# Patient Record
Sex: FEMALE | Race: WHITE
[De-identification: names, ages, dates, MRNs, and addresses within clinical notes are randomized per-mention and may not be internally consistent; named-entity substitution may affect disease eponyms.]

---

## 2019-01-11 ENCOUNTER — HOSPITAL ENCOUNTER (EMERGENCY)
Dept: HOSPITAL 41 - JD.ED | Age: 64
Discharge: NURSING, REHAB OR LONG-TERM CARE | End: 2019-01-11
Payer: MEDICARE

## 2019-01-11 VITALS — SYSTOLIC BLOOD PRESSURE: 191 MMHG | DIASTOLIC BLOOD PRESSURE: 123 MMHG

## 2019-01-11 DIAGNOSIS — Z79.82: ICD-10-CM

## 2019-01-11 DIAGNOSIS — R79.89: ICD-10-CM

## 2019-01-11 DIAGNOSIS — I10: ICD-10-CM

## 2019-01-11 DIAGNOSIS — I63.9: Primary | ICD-10-CM

## 2019-01-11 DIAGNOSIS — F17.210: ICD-10-CM

## 2019-01-11 DIAGNOSIS — E87.6: ICD-10-CM

## 2019-01-11 DIAGNOSIS — Z79.899: ICD-10-CM

## 2019-01-11 DIAGNOSIS — Z88.8: ICD-10-CM

## 2019-01-11 PROCEDURE — 93005 ELECTROCARDIOGRAM TRACING: CPT

## 2019-01-11 PROCEDURE — 85730 THROMBOPLASTIN TIME PARTIAL: CPT

## 2019-01-11 PROCEDURE — 84484 ASSAY OF TROPONIN QUANT: CPT

## 2019-01-11 PROCEDURE — 71045 X-RAY EXAM CHEST 1 VIEW: CPT

## 2019-01-11 PROCEDURE — 85610 PROTHROMBIN TIME: CPT

## 2019-01-11 PROCEDURE — 82962 GLUCOSE BLOOD TEST: CPT

## 2019-01-11 PROCEDURE — 70450 CT HEAD/BRAIN W/O DYE: CPT

## 2019-01-11 PROCEDURE — 85025 COMPLETE CBC W/AUTO DIFF WBC: CPT

## 2019-01-11 PROCEDURE — 36415 COLL VENOUS BLD VENIPUNCTURE: CPT

## 2019-01-11 PROCEDURE — 99285 EMERGENCY DEPT VISIT HI MDM: CPT

## 2019-01-11 PROCEDURE — 80053 COMPREHEN METABOLIC PANEL: CPT

## 2019-01-11 NOTE — CT
Head CT

 

Technique: Multiple axial sections through the brain were obtained.  

Intravenous contrast was not utilized.

 

Comparison: No prior intracranial imaging is available.

 

Findings: Ventricles along with basal cisterns and sulci over the 

convexities are within normal limits for the patient's age.  Old 8 mm 

infarct is noted within the left basal ganglia.  Mild areas of 

diminished density are noted within the basal ganglia as well as 

within the periventricular white matter compatible with small vessel 

ischemic demyelination change.  No other abnormal parenchymal 

densities are seen.  No evidence of intracranial hemorrhage.  No 

midline shift or mass effect is seen.

 

Bone window settings were reviewed which show no acute calvarial 

abnormality.  Visualized sinuses show minimal mucosal thickening 

within a posterior right ethmoid sinus.

 

Impression:

1.  Senescent changes as noted above.  Old small infarct within the 

left basal ganglia.

2.  Sinus finding which is felt to be incidental.

3.  No acute intracranial abnormality is identified.

 

Diagnostic code #2

## 2019-01-11 NOTE — CR
Chest: Portable view of the chest was obtained.

 

Comparison: Prior chest x-ray of 09/11/15.

 

Heart size is normal.  Slight tortuosity of the thoracic aorta is 

seen.  Minimal scoliosis is noted.  Prior cervical spine surgery is 

seen.  Penciling of the distal clavicles are noted which can be seen 

from prior surgery as well as from rheumatoid arthritis.  Surgical 

clips are seen from prior cholecystectomy.

 

Impression:

1.  Findings which are felt to be incidental as described above.  

Nothing acute is appreciated.

 

Diagnostic code #2

## 2019-02-02 ENCOUNTER — HOSPITAL ENCOUNTER (EMERGENCY)
Dept: HOSPITAL 41 - JD.ED | Age: 64
Discharge: NURSING, REHAB OR LONG-TERM CARE | End: 2019-02-02
Payer: MEDICARE

## 2019-02-02 VITALS — SYSTOLIC BLOOD PRESSURE: 140 MMHG | DIASTOLIC BLOOD PRESSURE: 69 MMHG

## 2019-02-02 DIAGNOSIS — R07.89: Primary | ICD-10-CM

## 2019-02-02 DIAGNOSIS — Z79.82: ICD-10-CM

## 2019-02-02 DIAGNOSIS — Z88.5: ICD-10-CM

## 2019-02-02 DIAGNOSIS — Z91.09: ICD-10-CM

## 2019-02-02 DIAGNOSIS — Z79.899: ICD-10-CM

## 2019-02-02 DIAGNOSIS — Z88.6: ICD-10-CM

## 2019-02-02 DIAGNOSIS — I10: ICD-10-CM

## 2019-02-02 DIAGNOSIS — Z87.891: ICD-10-CM

## 2019-02-02 DIAGNOSIS — Z88.8: ICD-10-CM

## 2019-02-02 PROCEDURE — 93005 ELECTROCARDIOGRAM TRACING: CPT

## 2019-02-02 PROCEDURE — G0480 DRUG TEST DEF 1-7 CLASSES: HCPCS

## 2019-02-02 PROCEDURE — 84484 ASSAY OF TROPONIN QUANT: CPT

## 2019-02-02 PROCEDURE — 71045 X-RAY EXAM CHEST 1 VIEW: CPT

## 2019-02-02 PROCEDURE — 80053 COMPREHEN METABOLIC PANEL: CPT

## 2019-02-02 PROCEDURE — 70450 CT HEAD/BRAIN W/O DYE: CPT

## 2019-02-02 PROCEDURE — 36415 COLL VENOUS BLD VENIPUNCTURE: CPT

## 2019-02-02 PROCEDURE — 99285 EMERGENCY DEPT VISIT HI MDM: CPT

## 2019-02-02 PROCEDURE — 85025 COMPLETE CBC W/AUTO DIFF WBC: CPT

## 2019-02-02 NOTE — CT
Addendum: Side discrepancy noted between body and impression.  

Previous report states an old infarct within left basal ganglia which 

is incorrect and should be right basal ganglia.

 

--- Addendum1 above dictated on [02/04/2019 17:20] by [NATHAN Manriquez, 

Sukhwinder GARCIA] ---

--- Addendum1 above signed on [02/05/2019 08:41] by [NATHAN Manriquez Hilton J.] ---

 

--- Original report below dictated on [02/02/2019 12:26] by [NATHAN Manriquez Hilton J.] ---

--- Original report below signed on [02/02/2019 12:26] by [NATHAN Manriquez Hilton J.] ---

 

Head CT

 

Technique: Multiple axial sections through the brain were obtained.  

Intravenous contrast was not utilized.

 

Comparison: Prior head CT study of 01/11/19.

 

Findings: Ventricles along with basal cisterns and sulci over the 

convexities are within normal limits for the patient's age.  Old small

 infarct is noted within the left basal ganglia which is stable.  

Diminished density is noted within portions of the periventricular 

white matter compatible with small vessel ischemic demyelination 

change.  Possible small old white matter infarct is noted on the left 

side next to the lateral ventricle within the white matter.  No other 

abnormal parenchymal densities are seen.  No evidence of intracranial 

hemorrhage.  No midline shift or mass effect is seen.

 

Bone window settings were reviewed which shows no acute calvarial 

abnormality.  Visualized sinuses shows minimal mucosal thickening 

inferiorly within the right mastoid sinus which is likely incidental.

 

Impression:

1.  Old stable infarct within the left basal ganglia.  Probable small 

old infarct within the periventricular white matter adjacent to the 

right lateral ventricle.

2.  Small vessel ischemic demyelination change is noted which appears 

stable.

3.  Nothing acute is seen intracranially.

4.  Slight findings within the right mastoid sinus which are felt to 

be incidental.

 

Diagnostic code #2

--- Addendum1 signed ---

## 2019-02-02 NOTE — EDM.PDOC
ED HPI GENERAL MEDICAL PROBLEM





- General


Chief Complaint: Chest Pain


Stated Complaint: [facility]


Time Seen by Provider: 02/02/19 09:41


Source of Information: Reports: Patient, RN Notes Reviewed





- History of Present Illness


INITIAL COMMENTS - FREE TEXT/NARRATIVE: 





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 

discomfort about 2 hours ago that did radiate to her back, left shoulder and an 

arm lasting about 1 hour. The discomfort now is gone. However she did get 

nauseated, short of breath with that and it was quite intense. She does have 

history of hypertension. She has history of recent CVA about 2 weeks ago with 

resultant left-sided weakness. She is not aware of any prior history for 

coronary artery disease. No abdominal or chest discomfort at this time and she 

is no longer short of breath.


  ** Mid-Sternal Chest


Pain Score (Numeric/FACES): 4





- Related Data


 Allergies











Allergy/AdvReac Type Severity Reaction Status Date / Time


 


coconut oil Allergy Mild Facial Verified 02/02/19 09:37





   Swelling  


 


meperidine HCl [From Demerol] Allergy Mild Facial Verified 02/02/19 09:37





   Swelling  


 


nickel Allergy Mild Rash Verified 02/02/19 09:37


 


ibuprofen [From Motrin IB] AdvReac Mild Stomach Verified 02/02/19 09:37





   Ache  











Home Meds: 


 Home Meds





Metoprolol Succinate [Toprol XL] 100 mg PO DAILY 02/24/15 [History]


Ramipril [Altace] 10 mg PO DAILY 02/24/15 [History]


cloNIDine HCl [Clonidine HCl] 0.2 mg PO Q8HR 02/24/15 [History]


SUMAtriptan Succinate [Imitrex] 100 mg PO BID PRN 05/13/15 [History]


Acetaminophen [Tylenol] 500 mg PO TID 02/02/19 [History]


Aspirin [Ecotrin] 81 mg PO DAILY 02/02/19 [History]


Bisacodyl [Dulcolax] 10 mg RECTAL DAILY PRN 02/02/19 [History]


Cyclobenzaprine [Flexeril] 5 mg PO TID PRN 02/02/19 [History]


FLUoxetine [PROzac] 40 mg PO DAILY 02/02/19 [History]


Nicotine [Nicotine Patch] 14 mg TD DAILY 02/02/19 [History]


Oxybutynin Chloride 5 mg PO DAILY 02/02/19 [History]


Sennosides/Docusate Sodium [Senna Plus Tablet] 1 tab PO BID 02/02/19 [History]


atorvaSTATin Calcium [Lipitor] 40 mg PO QPM 02/02/19 [History]











Past Medical History


HEENT History: Reports: Impaired Vision


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 

with minimal assistance.


Cardiovascular History: Reports: Hypertension


Other Cardiovascular History: Echo - SB 57, Bilateral Atrial Enlg, Mod MR and AR

, Mod. dilated LA, EF 60%


Genitourinary History: Reports: Urinary Incontinence


OB/GYN History: Reports: Pregnancy


Other OB/GYN History: [facility]


Neurological History: Reports: CVA, Migraines


Other Psychiatric History: Chronic Pain and opioid use





- Past Surgical History


HEENT Surgical History: Reports: Other (See Below)


Other HEENT Surgeries/Procedures: ruptured left ear drum


GI Surgical History: Reports: Cholecystectomy


Female  Surgical History: Reports: Hysterectomy


Other Neurological Surgeries/Procedures: Stent placed in neck to help with 

Migraines.


Other Musculoskeletal Surgeries/Procedures:: Bilateral shoulder surgery to 

remove spurs, right knee surgery X 3, left knee surgery x2, bilateral carpal 

tunel surgery, surgery to neck to remove spur off the posterior cervical spine, 

bone spur removal on L wrist, r middle toe, and r thumb, cortisone injections q 

3-4 months to lower back





Social & Family History





- Family History


Family Medical History: Noncontributory





- Tobacco Use


Smoking Status *Q: Former Smoker


Years of Tobacco use: 30


Packs/Tins Daily: 0.5


Used Tobacco, but Quit: Yes


Month/Year Tobacco Last Used: 1/2019


Second Hand Smoke Exposure: Yes





- Caffeine Use


Caffeine Use: Reports: Coffee, Soda





- Recreational Drug Use


Recreational Drug Use: No





ED ROS GENERAL





- Review of Systems


Review Of Systems: See Below


Constitutional: Denies: Fever, Chills, Diaphoresis


HEENT: Denies: Throat Pain


Respiratory: Reports: Shortness of Breath (Gone).  Denies: Pleuritic Chest Pain


Cardiovascular: Reports: Chest Pain


GI/Abdominal: Reports: Nausea.  Denies: Abdominal Pain (Gone), Vomiting (Gone)


Musculoskeletal: Reports: Shoulder Pain, Arm Pain, Back Pain


Skin: Reports: No Symptoms


Neurological: Reports: No Symptoms





ED EXAM, GENERAL





- Physical Exam


Exam: See Below


General Appearance: Alert, No Apparent Distress


Eye Exam: Bilateral Eye: PERRL


Throat/Mouth: Normal Inspection


Head: Atraumatic.  No: Facial Swelling


Neck: Supple, Full Range of Motion


Respiratory/Chest: No Respiratory Distress, Lungs Clear, Normal Breath Sounds, 

Chest Non-Tender


Cardiovascular: Regular Rate, Rhythm


GI/Abdominal: Soft, Non-Tender.  No: Guarding


Extremities: No: Leg Pain, Increased Warmth, Redness


Neurological: Alert, Other (She has a left-sided facial droop and left-sided 

upper and lower extremity weakness)


Skin Exam: Warm, Dry, Normal Color





EKG INTERPRETATION


EKG Date: 02/02/19


Rhythm: NSR


Axis: Normal


P-Wave: Present


QRS: Other (There are Q waves leads 3 and aVF inferiorly)


ST-T: Elevated (There is very mild ST elevation in lead 3)





Course





- Vital Signs


Last Recorded V/S: 


 Last Vital Signs











Temp  97.7 F   02/02/19 14:00


 


Pulse  58 L  02/02/19 14:00


 


Resp  20   02/02/19 14:00


 


BP  140/69   02/02/19 14:00


 


Pulse Ox  96   02/02/19 14:00














- Orders/Labs/Meds


Orders: 


 Active Orders 24 hr











 Category Date Time Status


 


 EKG 12 Lead [EKG Documentation Completion] [RC] STAT Care  02/02/19 10:05 

Active


 


 Peripheral IV Care [RC] .AS DIRECTED Care  02/02/19 09:49 Active


 


 Chest 1V Frontal [CR] Stat Exams  02/02/19 09:49 Taken


 


 Peripheral IV Insertion Adult [OM.PC] Stat Oth  02/02/19 09:49 Ordered











Labs: 


 Laboratory Tests











  02/02/19 02/02/19 02/02/19 Range/Units





  10:08 10:08 12:25 


 


WBC  11.25 H    (3.98-10.04)  K/mm3


 


RBC  4.71    (3.98-5.22)  M/mm3


 


Hgb  13.3    (11.2-15.7)  gm/L


 


Hct  39.3    (34.1-44.9)  %


 


MCV  83.4    (79.4-94.8)  fl


 


MCH  28.2    (25.6-32.2)  pg


 


MCHC  33.8    (32.2-35.5)  g/dl


 


RDW Std Deviation  43.2    (36.4-46.3)  fL


 


Plt Count  242    (182-369)  K/mm3


 


MPV  11.3    (9.4-12.3)  fl


 


Neut % (Auto)  66.8    (34.0-71.1)  %


 


Lymph % (Auto)  26.8    (19.3-51.7)  %


 


Mono % (Auto)  4.2 L    (4.7-12.5)  %


 


Eos % (Auto)  1.6    (0.7-5.8)  


 


Baso % (Auto)  0.5    (0.1-1.2)  %


 


Neut # (Auto)  7.52 H    (1.56-6.13)  K/mm3


 


Lymph # (Auto)  3.01    (1.18-3.74)  K/mm3


 


Mono # (Auto)  0.47 H    (0.24-0.36)  K/mm3


 


Eos # (Auto)  0.18    (0.04-0.36)  K/mm3


 


Baso # (Auto)  0.06    (0.01-0.08)  K/mm3


 


Sodium   141   (136-145)  mEq/L


 


Potassium   3.2 L   (3.5-5.1)  mEq/L


 


Chloride   104   ()  mEq/L


 


Carbon Dioxide   26   (21-32)  mEq/L


 


Anion Gap   14.2   (5-15)  


 


BUN   13   (7-18)  mg/dL


 


Creatinine   0.9   (0.55-1.02)  mg/dL


 


Est Cr Clr Drug Dosing   52.66   mL/min


 


Estimated GFR (MDRD)   > 60   (>60)  mL/min


 


BUN/Creatinine Ratio   14.4   (14-18)  


 


Glucose   128 H   ()  mg/dL


 


Calcium   9.3   (8.5-10.1)  mg/dL


 


Total Bilirubin   0.3   (0.2-1.0)  mg/dL


 


AST   23   (15-37)  U/L


 


ALT   24   (14-59)  U/L


 


Alkaline Phosphatase   91   ()  U/L


 


Troponin I   < 0.017  < 0.017  (0.00-0.056)  ng/mL


 


Total Protein   7.3   (6.4-8.2)  g/dl


 


Albumin   3.7   (3.4-5.0)  g/dl


 


Globulin   3.6   gm/dL


 


Albumin/Globulin Ratio   1.0   (1-2)  


 


Ethyl Alcohol   0.00   (0.00)  gm%











Meds: 


Medications














Discontinued Medications














Generic Name Dose Route Start Last Admin





  Trade Name Laura  PRN Reason Stop Dose Admin


 


Acetaminophen  975 mg  02/02/19 11:21  02/02/19 11:57





  Tylenol  PO  02/02/19 11:22  975 mg





  NOW ONE   Administration





     





     





     





     


 


Clonidine HCl  0.1 mg  02/02/19 09:49  02/02/19 10:14





  Catapres  PO  02/02/19 09:50  0.1 mg





  ONETIME ONE   Administration





     





     





     





     


 


Potassium Chloride  20 meq  02/02/19 11:22  02/02/19 11:58





  Klor-Con M20  PO  02/02/19 11:23  20 meq





  ONETIME ONE   Administration





     





     





     





     


 


Sodium Chloride  10 ml  02/02/19 09:49  02/02/19 10:00





  Saline Flush  FLUSH   10 ml





  ASDIRECTED PRN   Administration





  Keep Vein Open   





     





     





     














- Re-Assessments/Exams


Free Text/Narrative Re-Assessment/Exam: 





02/02/19 11:04


EKG as noted shows Q waves inferiorly leads slight ST elevation in lead 3 

inferiorly. Initial troponins come back negative. Her blood pressure was really 

high on the scene and still high on arrival to ED around 213 systolic. We did 

give a dose of clonidine 0.1 mg by mouth. His helped and with time her blood 

pressure has trended down to a current level of 168/92. She remains in sinus 

rhythm, no ectopy. Continues pain-free while here in the ED. Her potassium is 

mildly low at 3.2. We'll plan to do a 2 hour troponin which will be about 4 

hours after onset of her pain 8:00 this past morning. We'll plan to give her 

some oral potassium after she has had something to eat.





Departure





- Departure


Time of Disposition: 13:13


Disposition: DC/Tfer to [facility]


Reason for Transfer *Q: Other


Condition: Fair


Clinical Impression: 


 Atypical chest pain





Hypertension


Qualifiers:


 Hypertension type: essential hypertension Qualified Code(s): I10 - Essential (

primary) hypertension





Headache


Qualifiers:


 Headache type: unspecified Headache chronicity pattern: acute headache 

Intractability: not intractable Qualified Code(s): R51 - Headache





Instructions:  Nonspecific Chest Pain, Easy-to-Read, Hypertension, Easy-to-Read


Referrals: 


Evelio Escobar Jr, MD [Primary Care Provider] - 


Forms:  ED Department Discharge


Additional Instructions: 


Continue current medications as prescribed, it will be important for nursing 

home staff to try give your meds as soon as possible in the morning.  Follow-up 

clinic as needed, return to ED as needed if symptoms worsening in any way.





- My Orders


Last 24 Hours: 


My Active Orders





02/02/19 09:49


Peripheral IV Care [RC] .AS DIRECTED 


Chest 1V Frontal [CR] Stat 


Peripheral IV Insertion Adult [OM.PC] Stat 





02/02/19 10:05


EKG 12 Lead [EKG Documentation Completion] [RC] STAT 














- Assessment/Plan


Last 24 Hours: 


My Active Orders





02/02/19 09:49


Peripheral IV Care [RC] .AS DIRECTED 


Chest 1V Frontal [CR] Stat 


Peripheral IV Insertion Adult [OM.PC] Stat 





02/02/19 10:05


EKG 12 Lead [EKG Documentation Completion] [RC] STAT

## 2021-07-17 NOTE — CR
Chest: Portable view of the chest was obtained.

 

Comparison: Prior chest x-ray of 01/11/19.

 

Heart size is normal.  Tortuous thoracic aorta is seen.  Lungs are 

clear.  Previous cervical spine surgery is noted.  Surgical clips are 

seen within the upper right abdomen.  Scoliosis is noted within the 

spine most of which appears to be positional.  Penciling of the distal

 clavicle is again noted which is stable and is nonacute.

 

Impression:

1.  Incidental findings.  Nothing acute is seen.

 

Diagnostic code #2 83

## 2023-04-02 ENCOUNTER — HOSPITAL ENCOUNTER (EMERGENCY)
Dept: HOSPITAL 41 - JD.ED | Age: 68
Discharge: NURSING, REHAB OR LONG-TERM CARE | End: 2023-04-02
Payer: MEDICARE

## 2023-04-02 VITALS — SYSTOLIC BLOOD PRESSURE: 184 MMHG | HEART RATE: 82 BPM | DIASTOLIC BLOOD PRESSURE: 89 MMHG

## 2023-04-02 DIAGNOSIS — I10: ICD-10-CM

## 2023-04-02 DIAGNOSIS — Z88.8: ICD-10-CM

## 2023-04-02 DIAGNOSIS — Z86.73: ICD-10-CM

## 2023-04-02 DIAGNOSIS — Z88.5: ICD-10-CM

## 2023-04-02 DIAGNOSIS — I21.4: Primary | ICD-10-CM

## 2023-04-02 DIAGNOSIS — Z79.899: ICD-10-CM

## 2023-04-02 DIAGNOSIS — Z91.048: ICD-10-CM

## 2023-04-02 DIAGNOSIS — R09.02: ICD-10-CM

## 2023-04-02 LAB — EGFRCR SERPLBLD CKD-EPI 2021: 14 ML/MIN (ref 60–?)

## 2023-04-02 PROCEDURE — 93005 ELECTROCARDIOGRAM TRACING: CPT

## 2023-04-02 PROCEDURE — 83880 ASSAY OF NATRIURETIC PEPTIDE: CPT

## 2023-04-02 PROCEDURE — 84484 ASSAY OF TROPONIN QUANT: CPT

## 2023-04-02 PROCEDURE — 96365 THER/PROPH/DIAG IV INF INIT: CPT

## 2023-04-02 PROCEDURE — 80053 COMPREHEN METABOLIC PANEL: CPT

## 2023-04-02 PROCEDURE — 85379 FIBRIN DEGRADATION QUANT: CPT

## 2023-04-02 PROCEDURE — 36415 COLL VENOUS BLD VENIPUNCTURE: CPT

## 2023-04-02 PROCEDURE — 99285 EMERGENCY DEPT VISIT HI MDM: CPT

## 2023-04-02 PROCEDURE — 85730 THROMBOPLASTIN TIME PARTIAL: CPT

## 2023-04-02 PROCEDURE — 85025 COMPLETE CBC W/AUTO DIFF WBC: CPT

## 2023-04-02 PROCEDURE — 71045 X-RAY EXAM CHEST 1 VIEW: CPT

## 2024-09-26 ENCOUNTER — HOSPITAL ENCOUNTER (EMERGENCY)
Dept: HOSPITAL 41 - JD.ED | Age: 69
Discharge: HOME | End: 2024-09-26
Payer: COMMERCIAL

## 2024-09-26 VITALS — HEART RATE: 82 BPM | SYSTOLIC BLOOD PRESSURE: 107 MMHG | DIASTOLIC BLOOD PRESSURE: 84 MMHG

## 2024-09-26 DIAGNOSIS — Z90.49: ICD-10-CM

## 2024-09-26 DIAGNOSIS — Z79.899: ICD-10-CM

## 2024-09-26 DIAGNOSIS — I10: Primary | ICD-10-CM

## 2024-09-26 DIAGNOSIS — Z90.710: ICD-10-CM

## 2024-09-26 DIAGNOSIS — R51.9: ICD-10-CM

## 2024-09-26 DIAGNOSIS — Z79.82: ICD-10-CM

## 2024-09-26 DIAGNOSIS — Z88.8: ICD-10-CM

## 2024-09-26 DIAGNOSIS — Z91.048: ICD-10-CM

## 2024-09-26 DIAGNOSIS — R20.2: ICD-10-CM

## 2024-09-26 DIAGNOSIS — Z91.018: ICD-10-CM

## 2024-09-26 LAB
ALBUMIN SERPL-MCNC: 3.6 G/DL (ref 3.4–5)
ALBUMIN/GLOB SERPL: 1 {RATIO} (ref 1–2)
ALP SERPL-CCNC: 84 U/L (ref 46–116)
ALT SERPL-CCNC: 28 U/L (ref 14–59)
ANION GAP SERPL CALC-SCNC: 13.8 MMOL/L (ref 5–15)
AST SERPL-CCNC: 24 U/L (ref 15–37)
BASOPHILS # BLD AUTO: 0.1 K/MM3 (ref 0–0.2)
BASOPHILS NFR BLD AUTO: 1.2 % (ref 0–1)
BILIRUB SERPL-MCNC: 0.5 MG/DL (ref 0.2–1)
BUN SERPL-MCNC: 25 MG/DL (ref 7–18)
BUN/CREAT SERPL: 16.7 (ref 14–18)
CALCIUM SERPL-MCNC: 9.3 MG/DL (ref 8.5–10.1)
CHLORIDE SERPL-SCNC: 104 MEQ/L (ref 98–107)
CO2 SERPL-SCNC: 27 MEQ/L (ref 21–32)
CREAT CL 24H UR+SERPL-VRATE: 29.7 ML/MIN
CREAT SERPL-MCNC: 1.5 MG/DL (ref 0.55–1.02)
CRP SERPL-MCNC: 0.52 MG/DL (ref ?–0.3)
EGFRCR SERPLBLD CKD-EPI 2021: 37 ML/MIN (ref 60–?)
EOSINOPHIL # BLD AUTO: 0.1 K/MM3 (ref 0–0.4)
EOSINOPHIL NFR BLD AUTO: 2.4 % (ref 0–6)
GLOBULIN SER-MCNC: 3.8 GM/DL
GLUCOSE SERPL-MCNC: 126 MG/DL (ref 70–99)
HCT VFR BLD AUTO: 40.3 % (ref 37–47)
HGB BLD-MCNC: 13.2 GM/DL (ref 12–16)
IMM GRANULOCYTES # BLD: 0.02 K/MM3 (ref 0–0.05)
IMM GRANULOCYTES NFR BLD: 0.3 % (ref 0–0.4)
LYMPHOCYTES # BLD AUTO: 2.2 K/MM3 (ref 1–4.8)
LYMPHOCYTES NFR BLD AUTO: 36.8 % (ref 24–44)
MCH RBC QN AUTO: 27.7 PG (ref 28–32)
MCHC RBC AUTO-ENTMCNC: 32.8 G/DL (ref 32–36)
MCHC RBC AUTO-ENTMCNC: 84.5 FL (ref 83–99)
MONOCYTES # BLD AUTO: 0.3 K/MM3 (ref 0–0.8)
MONOCYTES NFR BLD AUTO: 5.4 % (ref 0–8)
NEUTROPHILS # BLD AUTO: 3.2 K/MM3 (ref 1.8–7.7)
NEUTROPHILS NFR BLD AUTO: 53.9 % (ref 41–71)
NRBC BLD AUTO-RTO: 0 % (ref 0–0.02)
NRBC BLD AUTO-RTO: 0 % (ref 0–0.2)
PLATELET # BLD AUTO: 253 K/MM3 (ref 150–400)
PMV BLD AUTO: 10.6 FL (ref 9.4–12.3)
POTASSIUM SERPL-SCNC: 3.8 MEQ/L (ref 3.5–5.1)
PROT SERPL-MCNC: 7.4 G/DL (ref 6.4–8.2)
RBC # BLD AUTO: 4.77 M/MM3 (ref 4.1–5.3)
SODIUM SERPL-SCNC: 141 MEQ/L (ref 136–145)
WBC # BLD AUTO: 5.93 K/MM3 (ref 3.9–11.3)

## 2024-09-26 PROCEDURE — 71045 X-RAY EXAM CHEST 1 VIEW: CPT

## 2024-09-26 PROCEDURE — 70450 CT HEAD/BRAIN W/O DYE: CPT

## 2024-09-26 PROCEDURE — 36415 COLL VENOUS BLD VENIPUNCTURE: CPT

## 2024-09-26 PROCEDURE — 86140 C-REACTIVE PROTEIN: CPT

## 2024-09-26 PROCEDURE — 93005 ELECTROCARDIOGRAM TRACING: CPT

## 2024-09-26 PROCEDURE — 80053 COMPREHEN METABOLIC PANEL: CPT

## 2024-09-26 PROCEDURE — 99285 EMERGENCY DEPT VISIT HI MDM: CPT

## 2024-09-26 PROCEDURE — 85025 COMPLETE CBC W/AUTO DIFF WBC: CPT
